# Patient Record
(demographics unavailable — no encounter records)

---

## 2025-04-24 NOTE — PHYSICAL EXAM
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

## 2025-04-24 NOTE — PLAN
[FreeTextEntry1] : annual: pap and hpv  Risks, benefits, alternative to combined oral contraceptives discussed. Patient told to carefully read package insert.  did mammogram in Feb, all fine (PMD ordered it) hx of small 1 cm fibroid noted on past exam  melanoma in situ: to derm q 6 months

## 2025-04-24 NOTE — HISTORY OF PRESENT ILLNESS
[Patient reported mammogram was normal] : Patient reported mammogram was normal [Patient reported breast sonogram was normal] : Patient reported breast sonogram was normal [Patient reported PAP Smear was normal] : Patient reported PAP Smear was normal [Y] : Patient is sexually active [Currently Active] : currently active [Men] : men [Patient refuses STI testing] : Patient refuses STI testing [N] : Patient denies prior pregnancies [No] : never pregnant [FreeTextEntry1] : presents for a well women visit and gyn exam.  [Mammogramdate] : 2025 [BreastSonogramDate] : 2025 [PapSmeardate] : 2024 [LMPDate] : 4/10/25 [MensesLength] : 5 [MensesFreq] : 28-30